# Patient Record
(demographics unavailable — no encounter records)

---

## 2024-10-16 NOTE — REASON FOR VISIT
[Patient preference] : as per patient preference [Continuing, patient seen in-person within last 12 months] : Telehealth services are continuing as patient has been seen in-person within last 12 months. [Telehealth (audio & video) - Individual/Group] : This visit was provided via telehealth using real-time 2-way audio visual technology. [Other Location: e.g. Home (Enter Location, City,State)___] : The patient, [unfilled], was located at [unfilled] at the time of the visit. [Patient's space is appropriate for telehealth and maintains privacy/confidentiality.] : Patient's space is appropriate for telehealth and maintains privacy/confidentiality. [Participant(s) identity verified] : Participant(s) identity verified. [Verbal consent obtained from patient/other participant(s)] : Verbal consent for telehealth/telephonic services obtained from patient/other participant(s) [Patient] : Patient [FreeTextEntry4] : 3:04pm [FreeTextEntry5] : 3:50pm [FreeTextEntry1] : routine outpatient talk therapy; the patient identifies as female, uses she/her pronouns and prefers to be called Emerald

## 2024-10-16 NOTE — PLAN
[Cognitive and/or Behavior Therapy] : Cognitive and/or Behavior Therapy  [Psychoeducation] : Psychoeducation  [Supportive Therapy] : Supportive Therapy [FreeTextEntry2] : Goal(s): Archana will manage her symptoms of gender dysphoria and social anxiety so that they do not interfere with her ability to attend school or socialize. Objective(s): Archana will utilize talk therapy as a safe space to explore her gender identity and gender expression; Archana will engage in activities that are affirming to her, when comfortable; Archana will work to identify triggers to her symptoms of anxiety so that she can develop coping skills to reduce anxious response to these triggers and will report success during session; Archana will gain effective communication and social skills and will report success or challenge in implementing these skills, during session  Frequency: Individual talk therapy with LCSW 1-4x per month; Family therapy with LCSW as needed (PRN), duration: 6-8 months.    [de-identified] : LCSW and Archana met for individual talk therapy via telehealth video visit. Archana was at home during this visit, she consented to this visit and confirmed that there was a private space to speak.  Archana presents as engaged with some improvement in energy and ability to attend classes and take care of some errands to ensure that she has enough food in her apartment.  She does process an episode of suicidal/self-harm thoughts that were fleeting and specific to a trigger of doing poorly on an exam, resulting in Archana admitting that her anxiety "spiraled."  LCSW provides more psychoeducation about the value of distraction and mindfulness in these moments as a coping skill, and we explore how Archana may be able to implement this in reality.  We also acknowledge how "negative" her thought process can become and explore ways to neutralize or ignore these thoughts since she can identify that her brain can be like a "bully."  We review some other behavioral changes that Archana has made to address ADLs and other ways she hopes to make changes to caring for herself.  She reports continued compliance with her HRT and psychotropic medications and continues to affirm herself through her clothing and engaging with folx who respect and support her gender identity, pronouns and preferred name. We agree to meet in 2 weeks to assess how Archana is doing; she confirms that she will reach this LCSW if her symptoms worsen or if she experiences active suicidal thoughts.   Time in:  3:04pm      Time out:   3:50pm Total time spent face to face, via video: 46 min [Return in ____ week(s)] : Return in [unfilled] week(s)

## 2024-10-16 NOTE — RISK ASSESSMENT
[Yes, patient reports ideation or behavior] : Yes, patient reports ideation or behavior [No, patient denies ideation or behavior] : No, patient denies ideation or behavior [Low acute suicide risk] : Low acute suicide risk [FreeTextEntry8] : Emerald reports experiencing passive thoughts for self-harm (wanting to stab herself in the hand) related to a specific trigger, notes that these thoughts lasted about 2 hours before she took a nap; she denies experiencing these thoughts in the last 48 hours since this specific trigger. She denies having means to sharp knives in her apartment and denies any current intent to act on these thoughts. Archana does not have a history of self-harm through cutting/burning and does not have a historty of actual attempts to end her life

## 2024-10-30 NOTE — PHYSICAL EXAM
[Unkept] : unkept [Average] : average [Depressed] : depressed [Anxious] : anxious [Depressive] : depressive [Self-Deprecatory] : self-deprecatory [Phobic] : phobic [None Reported] : none reported [Attention/Concentration] : attention/concentration [Above average] : above average [WNL] : within normal limits

## 2024-10-30 NOTE — REASON FOR VISIT
[Patient preference] : as per patient preference [Other:___] : due to [unfilled] [Continuing, patient seen in-person within last 12 months] : Telehealth services are continuing as patient has been seen in-person within last 12 months. [Telehealth (audio & video) - Individual/Group] : This visit was provided via telehealth using real-time 2-way audio visual technology. [Medical Office: ([facility])___] : The provider was located at the medical office in [unfilled]. [Other Location: e.g. Home (Enter Location, City,State)___] : The patient, [unfilled], was located at [unfilled] at the time of the visit. [Patient's space is appropriate for telehealth and maintains privacy/confidentiality.] : Patient's space is appropriate for telehealth and maintains privacy/confidentiality. [Participant(s) identity verified] : Participant(s) identity verified. [Verbal consent obtained from patient/other participant(s)] : Verbal consent for telehealth/telephonic services obtained from patient/other participant(s) [FreeTextEntry4] : 3:09pm [FreeTextEntry5] : 4:10pm [Patient] : Patient [FreeTextEntry1] : Routine outpatient talk therapy; the patient identifies as female, uses she/her pronouns and prefers to be called Emerald

## 2024-10-30 NOTE — RISK ASSESSMENT
[Yes, patient reports ideation or behavior] : Yes, patient reports ideation or behavior [No, patient denies ideation or behavior] : No, patient denies ideation or behavior [FreeTextEntry8] : passive SI reported. Archana expresses "what's the point" and admits that she wishes she could sleep to avoid dealing with life  [Low acute suicide risk] : Low acute suicide risk [No] : No [FreeTextEntry3] : extended session to assess for risk

## 2024-10-30 NOTE — PLAN
[FreeTextEntry2] : Goal(s): Archana will manage her symptoms of gender dysphoria and social anxiety so that they do not interfere with her ability to attend school or socialize. Objective(s): Archana will utilize talk therapy as a safe space to explore her gender identity and gender expression; Archana will engage in activities that are affirming to her, when comfortable; Archana will work to identify triggers to her symptoms of anxiety so that she can develop coping skills to reduce anxious response to these triggers and will report success during session; Archana will gain effective communication and social skills and will report success or challenge in implementing these skills, during session  Frequency: Individual talk therapy with LCSW 1-4x per month; Family therapy with LCSW as needed (PRN), duration: 6-8 months.    [Cognitive and/or Behavior Therapy] : Cognitive and/or Behavior Therapy  [Supportive Therapy] : Supportive Therapy [de-identified] : LCSW and Archana met for individual talk therapy via telehealth video visit. Archana was at home during this visit, she consented to this visit and confirmed that there was a private space to speak.  Archana presents as engaged, though reporting an increase in depression following a reported panic attack.  Throughout the session, Archana becomes more tearful, and at the end of the session is sobbing due to her anxiety about the future and fear that she is "not able" to work a job to support herself.  Risk is assessed and Archana denies any active ideation to harm herself despite feeling overwhelmed and scared by the future.  We take note of Archana's efforts to address her hygiene and shower, which she was able to do 1x in the past two weeks.  She processes the specific trigger to her panic attack and in processing this experience, she becomes tearful.  Emerald struggles to re-frame and is actively engaging in cognitive distortions that she struggles to challenge with support from this LCSW.  We review concrete activities that she can engage in this coming weekend to support herself in feeling better, including time to spend with friends either in person or virtually, and the value of her avoiding procrastination related to school work so that she has more free time to re-charge.  We agree to resume weekly sessions and Archana is encouraged to reach this LCSW if her ideation becomes more active. Support and validation were provided.    Time in: 3:09pm     Time out: 4:10pm Total time spent face to face, via video: 61 min [Return in ____ week(s)] : Return in [unfilled] week(s)

## 2024-10-30 NOTE — END OF VISIT
[Duration of Psychotherapy Visit (minutes spent in synchronous communication): ____] : Duration of Psychotherapy Visit (minutes spent in synchronous communication): [unfilled] [Individual Psychotherapy for 53+ minutes] : Individual Psychotherapy for 53+ minutes  [FreeTextEntry2] : assessment of risk and safety planning  [Licensed Clinician] : Licensed Clinician

## 2024-11-06 NOTE — REASON FOR VISIT
[Patient preference] : as per patient preference [Other:___] : due to [unfilled] [Continuing, patient seen in-person within last 12 months] : Telehealth services are continuing as patient has been seen in-person within last 12 months. [Telehealth (audio & video) - Individual/Group] : This visit was provided via telehealth using real-time 2-way audio visual technology. [Medical Office: ([facility])___] : The provider was located at the medical office in [unfilled]. [Other Location: e.g. Home (Enter Location, City,State)___] : The patient, [unfilled], was located at [unfilled] at the time of the visit. [Patient's space is appropriate for telehealth and maintains privacy/confidentiality.] : Patient's space is appropriate for telehealth and maintains privacy/confidentiality. [Participant(s) identity verified] : Participant(s) identity verified. [Verbal consent obtained from patient/other participant(s)] : Verbal consent for telehealth/telephonic services obtained from patient/other participant(s) [FreeTextEntry4] : 3:04pm [FreeTextEntry5] : 3:54pm [Patient] : Patient [FreeTextEntry1] : Routine outpatient talk therapy; the patient identifies as female, uses she/her pronouns and prefers to be called Emerald

## 2024-11-06 NOTE — PLAN
[FreeTextEntry2] : Goal(s): Archana will manage her symptoms of gender dysphoria and social anxiety so that they do not interfere with her ability to attend school or socialize. Objective(s): Archana will utilize talk therapy as a safe space to explore her gender identity and gender expression; Archana will engage in activities that are affirming to her, when comfortable; Archana will work to identify triggers to her symptoms of anxiety so that she can develop coping skills to reduce anxious response to these triggers and will report success during session; Archana will gain effective communication and social skills and will report success or challenge in implementing these skills, during session  Frequency: Individual talk therapy with LCSW 1-4x per month; Family therapy with LCSW as needed (PRN), duration: 6-8 months.    [Cognitive and/or Behavior Therapy] : Cognitive and/or Behavior Therapy  [Skills training (all types)] : Skills training (all types)  [Supportive Therapy] : Supportive Therapy [de-identified] : HOMEROW and Archana met for individual talk therapy via telehealth video visit. Archana was in her dorm room during this visit, she consented to this visit and confirmed that there was a private space to speak.  Archana presents with improvement in mental status, she reports some reduction in anxiety and denies no panic attacks in 1 week, but notes ongoing depressed mood and negative thoughts.  She reports how receiving some support from her parents was helpful to her though it has been an ongoing challenge this week to attend the class specific to her major for her fear that she cannot do well enough, thus lending to her avoidant behavior.  Archana is able to identify some more self-care that she can engage in, as we review how vital it is for her to get adequate sleep and care for her bod through drinking and eating enough.  We work together to set some goals and identify the ways that Archana can support herself to work on these goals. Archana is also encouraged to consider how to spend more of her free time with friends so that she can feel both supported and affirmed.  Next visit set for 1 week.    Time in: 3:04pm     Time out:   3:54pm Total time spent face to face, via video:  50 min [Return in ____ week(s)] : Return in [unfilled] week(s)

## 2024-11-13 NOTE — REASON FOR VISIT
[Patient preference] : as per patient preference [Continuing, patient seen in-person within last 12 months] : Telehealth services are continuing as patient has been seen in-person within last 12 months. [Telehealth (audio & video) - Individual/Group] : This visit was provided via telehealth using real-time 2-way audio visual technology. [Medical Office: ([facility])___] : The provider was located at the medical office in [unfilled]. [Other Location: e.g. Home (Enter Location, City,State)___] : The patient, [unfilled], was located at [unfilled] at the time of the visit. [Patient's space is appropriate for telehealth and maintains privacy/confidentiality.] : Patient's space is appropriate for telehealth and maintains privacy/confidentiality. [Participant(s) identity verified] : Participant(s) identity verified. [Verbal consent obtained from patient/other participant(s)] : Verbal consent for telehealth/telephonic services obtained from patient/other participant(s) [Patient] : Patient [FreeTextEntry4] : 3:06pm [FreeTextEntry5] : 3:56pm [FreeTextEntry1] : Routine outpatient talk therapy; the patient identifies as female, uses she/her pronouns and prefers to be called Emerald

## 2024-11-13 NOTE — PLAN
[Cognitive and/or Behavior Therapy] : Cognitive and/or Behavior Therapy  [Skills training (all types)] : Skills training (all types)  [Supportive Therapy] : Supportive Therapy [FreeTextEntry2] : Goal(s): Archana will manage her symptoms of gender dysphoria and social anxiety so that they do not interfere with her ability to attend school or socialize. Objective(s): Archana will utilize talk therapy as a safe space to explore her gender identity and gender expression; Archana will engage in activities that are affirming to her, when comfortable; Archana will work to identify triggers to her symptoms of anxiety so that she can develop coping skills to reduce anxious response to these triggers and will report success during session; Archana will gain effective communication and social skills and will report success or challenge in implementing these skills, during session  Frequency: Individual talk therapy with LCSW 1-4x per month; Family therapy with LCSW as needed (PRN), duration: 6-8 months.    [de-identified] : ALEKS and Archana met for individual talk therapy via telehealth video visit. Archana was at home during this visit, she consented to this visit and confirmed that there was a private space to speak.  Archana presents as engaged, she is calmer than previous sessions but remains depressed.  Archana shares updates regarding withdrawing from a class that she was failing.  She takes time to process this and while she reports "relief", she remains anxious about what her future will include as she was hoping to use this course to explore a potential major.  Archana reports that she intends on completing the remainder of her semester but is unsure of what her plans may be to return to school or consideration for transfer.  We explore how her symptoms are manifesting as she reports experiencing more of a "range" of emotions compared to feelings of numbness like previous weeks.  We note how procrastination and avoidance continue to manifest as barriers for her related to self-care and some necessary ADLs like showering and ensuring that she consistently goes grocery shopping for herself.  We review some goals that she has set for the weekend and the free time she has to "re-charge."  Salma reports receiving support from her mother, which she expresses appreciation for.     Time in:  3:06pm    Time out:  3:56pm Total time spent face to face, via video: 50 min [Return in ____ week(s)] : Return in [unfilled] week(s)

## 2024-11-26 NOTE — REASON FOR VISIT
[Patient preference] : as per patient preference [Continuing, patient seen in-person within last 12 months] : Telehealth services are continuing as patient has been seen in-person within last 12 months. [Telehealth (audio & video) - Individual/Group] : This visit was provided via telehealth using real-time 2-way audio visual technology. [Medical Office: ([facility])___] : The provider was located at the medical office in [unfilled]. [Other Location: e.g. Home (Enter Location, City,State)___] : The patient, [unfilled], was located at [unfilled] at the time of the visit. [Patient's space is appropriate for telehealth and maintains privacy/confidentiality.] : Patient's space is appropriate for telehealth and maintains privacy/confidentiality. [Participant(s) identity verified] : Participant(s) identity verified. [Verbal consent obtained from patient/other participant(s)] : Verbal consent for telehealth/telephonic services obtained from patient/other participant(s) [FreeTextEntry4] : 3:04pm [FreeTextEntry5] : 3:40pm [Patient] : Patient [FreeTextEntry1] : Routine outpatient talk therapy; the patient identifies as female, uses she/her pronouns and prefers to be called Emerald

## 2024-11-26 NOTE — PLAN
[FreeTextEntry2] : Goal(s): Archana will manage her symptoms of gender dysphoria and social anxiety so that they do not interfere with her ability to attend school or socialize. Objective(s): Archana will utilize talk therapy as a safe space to explore her gender identity and gender expression; Archana will engage in activities that are affirming to her, when comfortable; Archana will work to identify triggers to her symptoms of anxiety so that she can develop coping skills to reduce anxious response to these triggers and will report success during session; Archana will gain effective communication and social skills and will report success or challenge in implementing these skills, during session  Frequency: Individual talk therapy with LCSW 1-4x per month; Family therapy with LCSW as needed (PRN), duration: 6-8 months.    [Cognitive and/or Behavior Therapy] : Cognitive and/or Behavior Therapy  [Supportive Therapy] : Supportive Therapy [de-identified] : ALEKS and Archana met for individual talk therapy via telehealth video visit. Archana was in her dorm during this visit, she consented to this visit and confirmed that there was a private space to speak.  Archana presents as depressed but reports "managing" at this time as she prepares to go home for the Thanksgiving holiday.  She notes missing her classes one day earlier this week due to feeling tired and struggling to sleep the night prior; she also reports experience of "auditory hallucinations" that were only present on that day, described as "buzzing" and her alarm going on.  She denies that these have persisted and she has not reported this experience before, and thus agrees to monitor and take note of any other instance.  She presents with ongoing avoidance of specific tasks related to school like registering for classes but notes that the coming deadline will encourage her to "get it done."  ALEKS reminds Archana about her upcoming telehealth visit with Dr. Roman and reminds Archana to schedule a follow-up with psychiatry.  Next visit confirmed for 2 weeks.    Time in: 3:04pm     Time out: 3:40pm Total time spent face to face, via video: 36 min  [Return in ____ week(s)] : Return in [unfilled] week(s)

## 2024-11-27 NOTE — ASSESSMENT
[FreeTextEntry1] : Salma (or Archana) is a 18 yo transfemale (she/her) with PMH of anxiety, depression here for gender affirming care.  #Gender dysphoria: -Counseled patient on options for gender-affirming hormone therapy, goals, risks (decreased libido/erectile function, increased CV and VTE risk, infertility) and benefits of therapy, as well as options for fertility preservation (sperm cryopreservation), and surgical intervention. The patient is not interested in fertility preservation. -Also discussed that estrogen therapy will not impact certain aspects of appearance including height, hand/foot size, voice. Expected timeline for changes (breast growth, decreased muscle mass, softer skin, fewer erections) is 6-18 months. - Discussed options for hair removal including electrolysis and laser - at initial visit: pt opted to start spironolactone 50 mg BID first. Discussed side effects including diuresis/dehydration, hyperkalemia. Discussed modest anti-androgen effect without estrogen, may see some breast development and skin changes. Now ready to start estrogen. We discussed long term effects, expectations, and the patient goals in extensive detail (without the parents present). Archana's parents joined at the end of the visit with her permission where they were free to ask questions. Dad was previously surprised that the plan was not lupron. Discussed that lupron is more often used in adolescent transgender medicine, it can be used in adults but usually not long term due to impact on bone health if not on adequate estrogen dosing. Lupron monotherapy would induce hypogonadism and thus would not be optimal. With patient's goal of eventual estrogen addition, spironolactone made the most sense for long term GAHT for this patient and is the preference of the patient. - pt added estradiol in 6/2023  RX: - spironolactone 150 mg daily  - continue PO estradiol 2 mg bid  Refer for vaginoplasty  LABS: - check serum estradiol (goal 100-200 pg/mL) testosterone (goal <50 ng/dL) today, then q3 months during first year of therapy. Plan to check q6 months to yearly once on stable dose  #Vit D deficiency: - cholecalciferol 2000 iu daily   cell [phone]  RTC 6 months teb ok

## 2024-11-27 NOTE — REASON FOR VISIT
[Follow - Up] : a follow-up visit [Transgender Care] : transgender care [Home] : at home, [unfilled] , at the time of the visit. [Medical Office: ([facility])___] : at the medical office located in  [Patient] : the patient [Self] : self

## 2024-11-27 NOTE — PHYSICAL EXAM

## 2024-11-27 NOTE — HISTORY OF PRESENT ILLNESS
[FreeTextEntry1] : For at least the past 4 years the patient has experienced a postprandial sensation that begins as a bubbling sensation in his throat followed by excess saliva and some throat congestion.  These episodes usually occur with the ingestion of ice tea, fatty foods or fried foods, lactose and certain soup broths.  If he does not eat these foods he does not experience the symptoms.  The episodes occur intermittently and rarely.  He denies any sensation of gastroesophageal reflux but thinks that the symptoms may be due to reflux.  Nevertheless there is no heartburn or dysphagia either.  His appetite and weight are stable.  He has chronic constipation with a bowel movement about once weekly.  His stools are occasionally hard.

## 2024-11-27 NOTE — ASSESSMENT
[FreeTextEntry1] : At this time I am not at all certain as to the etiology of his symptoms but I doubt they are due to gastroesophageal reflux but I cannot be altogether certain.  She will therefore be scheduled for an upper endoscopy.  If there is evidence of esophagitis or Dickey's esophagus then she does indeed have acid reflux.  If the exam is normal it does not exclude that diagnosis but in view of her symptoms it would be most unlikely.  At that point I would simply recommend that she avoid the inciting foods.  I have also recommended that she take Benefiber 1 heaping tablespoon in 6 to 8 ounces of fluid on a daily basis for her underlying constipation.  The risks, benefits, complications and possible adverse consequences associated with upper endoscopy were discussed with the patient.

## 2024-11-27 NOTE — HISTORY OF PRESENT ILLNESS
[FreeTextEntry1] : Salma (or Archana) is a 18 yo transfemale (she/her) with PMH of anxiety, depression here for gender affirming care.  Per chart review, Archana has identified as female for a long time, started voicing ig in May 2021. She has wanted less male hormones. She has been following closely with [facility] and [facility], along with her parents.  She previously has reported that she is not interested in having children and is not interested in fertility preservation.  She would like to present more feminine.   Interested in hormone blockers. Goal is to reduce testosterone, Goal is to eventually start full HRT. Does not want to start estrogen without parents' support.  Ideally wants to start before college in the fall but notes that gradual start is preferred. Looking forward to breast development, wants to be more in tune with emotions. Excited to feel better.  Wants to be able to pass as female, will aim to dress modestly.   Transition history: per Dr. Walker's initial visit: "She reports that she first began to question her gender identity when she saw a  who is trans talked about gender roles expected. She has a friend who came out as trans when they were 13 years old and opened her mind to transgender identity. and that began to open Archana's mind to transgender identities. She often has dreams and fantasies of wanting to turn into a girl or of being a girl. She came out to her parents and brothers 5/8/21 and out to two friends at the time." At age 17, pt was considering lupron with Dr. Walker however this was never started - mother was supportive at time but father needed more time to think about it.   Taking zoloft. Seeing psychiatrist Dr. Hung  Causes of body dysphoria include genitals - just by existing, has general level of discomfort.  Interested in bottom surgery in the future. Not interested in top surgery. Has thought about fertility preservation. 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 Not interested in having children.  Started GAHT in 6/2023   Current regimen: lawson 150 mg daily PO estradiol 2 mg BID  happy with progress   FHx is significant for:  maternal father with CAD and stents  at age 80    SH: no tobacco, drugs, tobacco has one close friend who is great support system (FTM) mom is supportive Starting college at [employer]  studying computer science.  Likes video games-ne 2

## 2024-12-04 NOTE — HISTORY OF PRESENT ILLNESS
[FreeTextEntry1] : Intake 6/2022 reviewed in EHR  [FreeTextEntry2] : Gender therapist at [facility] since 2021\par  Hospitalizations: none\par  Medications: none. \par

## 2024-12-04 NOTE — PLAN
[FreeTextEntry5] :  counseling and support provided, -continue in IT with Jackelin -will continue Zoloft 100mg po qhs for depressive symptoms. Risks and benefits discussed with pt including black box warning. Pt consents to treatment -continue Wellbutrin 300mg xl po daily - risks and benefits discussed with pt including black box warning -med compliance encouraged -vit D, going outside, working out encouraged -er/911 prn -f/u Feb

## 2024-12-04 NOTE — REASON FOR VISIT
[Telehealth (audio & video) - Individual/Group] : This visit was provided via telehealth using real-time 2-way audio visual technology. [Medical Office: ([facility])___] : The provider was located at the medical office in [unfilled]. [Home] : The patient, [unfilled], was located at home, [unfilled], at the time of the visit. [Participant(s) identity verified] : Participant(s) identity verified. [Verbal consent obtained from patient/other participant(s)] : Verbal consent for telehealth/telephonic services obtained from patient/other participant(s) [Patient] : Patient [FreeTextEntry1] : anxiety/mood

## 2024-12-04 NOTE — SOCIAL HISTORY
[FreeTextEntry1] : Born and raised in [place] with bio parents and older brother; reports growing up felt safe and supported in home, experienced bullying in elementary and middle school; gender journey as per hpi; "very bright" student; Had few friends in person in middle school; In high school most interactions online, shared interests of fencing, dungeons and dragons. 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, computer science major.    no known substance use

## 2024-12-09 NOTE — PLAN
[FreeTextEntry2] : Goal(s): Archana will manage her symptoms of gender dysphoria and social anxiety so that they do not interfere with her ability to attend school or socialize. Objective(s): Archana will utilize talk therapy as a safe space to explore her gender identity and gender expression; Archana will engage in activities that are affirming to her, when comfortable; Archana will work to identify triggers to her symptoms of anxiety so that she can develop coping skills to reduce anxious response to these triggers and will report success during session; Archana will gain effective communication and social skills and will report success or challenge in implementing these skills, during session  Frequency: Individual talk therapy with LCSW 1-4x per month; Family therapy with LCSW as needed (PRN), duration: 6-8 months.    [Cognitive and/or Behavior Therapy] : Cognitive and/or Behavior Therapy  [Supportive Therapy] : Supportive Therapy [de-identified] : ALEKS and Archana met for individual talk therapy via telehealth video visit. Archana was in her dorm room at school during this visit, she consented to this visit and confirmed that there was a private space to speak.  Archana reports that she is doing "ok", noting that her time home for the Thanksgiving break was appreciated and allowed her to relax.  She notes feeling overall less depressed with efforts to manage her anxiety, as her semester begins to wind down.  She notes ongoing procrastination with some of her school work, but reports overall feeling that she can manage the remaining work and final exams.  Archana shares that she has thought more about her future, while visiting home with support of her mom, and is registered for classes next semester.  We review some updates from her recent medical visits and she notes compliance with her HRT and psychotropic medications.  We also confirm upcoming visits for the remainder of this month as she returns home for break in between semesters.  Much support and encouragement are provided.    Time in:  3:07pm    Time out: 2:45pm Total time spent face to face, via video: 38 min [Return in ____ week(s)] : Return in [unfilled] week(s)

## 2024-12-09 NOTE — REASON FOR VISIT
[Patient preference] : as per patient preference [Continuing, patient seen in-person within last 12 months] : Telehealth services are continuing as patient has been seen in-person within last 12 months. [Telehealth (audio & video) - Individual/Group] : This visit was provided via telehealth using real-time 2-way audio visual technology. [Medical Office: ([facility])___] : The provider was located at the medical office in [unfilled]. [Other Location: e.g. Home (Enter Location, City,State)___] : The patient, [unfilled], was located at [unfilled] at the time of the visit. [Patient's space is appropriate for telehealth and maintains privacy/confidentiality.] : Patient's space is appropriate for telehealth and maintains privacy/confidentiality. [Participant(s) identity verified] : Participant(s) identity verified. [Verbal consent obtained from patient/other participant(s)] : Verbal consent for telehealth/telephonic services obtained from patient/other participant(s) [FreeTextEntry4] : 3:07pm [FreeTextEntry5] : 3:45pm [Patient] : Patient [FreeTextEntry1] : Routine outpatient talk therapy; the patient identifies as female, uses she/her pronouns and prefers to be called Emerald

## 2024-12-26 NOTE — REASON FOR VISIT
[Patient preference] : as per patient preference [Continuing, patient seen in-person within last 12 months] : Telehealth services are continuing as patient has been seen in-person within last 12 months. [Telehealth (audio & video) - Individual/Group] : This visit was provided via telehealth using real-time 2-way audio visual technology. [Medical Office: ([facility])___] : The provider was located at the medical office in [unfilled]. [Home] : The patient, [unfilled], was located at home, [unfilled], at the time of the visit. [Patient's space is appropriate for telehealth and maintains privacy/confidentiality.] : Patient's space is appropriate for telehealth and maintains privacy/confidentiality. [Participant(s) identity verified] : Participant(s) identity verified. [Verbal consent obtained from patient/other participant(s)] : Verbal consent for telehealth/telephonic services obtained from patient/other participant(s) [Patient] : Patient [FreeTextEntry4] : 3:05pm [FreeTextEntry5] : 3:53pm [FreeTextEntry1] : Routine outpatient talk therapy; the patient identifies as female, uses she/her pronouns and prefers to be called Emerald

## 2024-12-26 NOTE — PLAN
[Cognitive and/or Behavior Therapy] : Cognitive and/or Behavior Therapy  [Supportive Therapy] : Supportive Therapy [FreeTextEntry2] : Goal(s): Archana will manage her symptoms of gender dysphoria and social anxiety so that they do not interfere with her ability to attend school or socialize. Objective(s): Archana will utilize talk therapy as a safe space to explore her gender identity and gender expression; Archana will engage in activities that are affirming to her, when comfortable; Archana will work to identify triggers to her symptoms of anxiety so that she can develop coping skills to reduce anxious response to these triggers and will report success during session; Archana will gain effective communication and social skills and will report success or challenge in implementing these skills, during session  Frequency: Individual talk therapy with LCSW 1-4x per month; Family therapy with LCSW as needed (PRN), duration: 6-8 months.    [de-identified] : HOMEROW and Archana met for individual talk therapy via telehealth video visit. Archana was at home during this visit, she consented to this visit and confirmed that there was a private space to speak.  Archana presents with general improvement in mood since she completed her semester and is back at home on [place].  She shares some updates regarding her final exams and challenges of living with her roommates who made complaints about her being "too loud" while spending time online with her friends, but notes feeling general relief that she was able to successfully complete her semester despite having to drop a class.  Archana's main focus of our session is on her sexual orientation; she has previously identified as aromantic and asexual or in her terms "chantell ace", but notes trying to better understand how she feels after forming some level of a "queer platonic partnership" with a friend she plays DND with.  Archana notes that it has been nice to support each other in a way that goes beyond some of her other friendships and she shares how this person's presence was "calming" to her as she worked to complete her semester, noting that it helped to have this new support.  We explore how sexual orientation can also be a spectrum or something that develops more with the "right person" in our life.  We note how Archana hopes to explore these feelings more over time and she shares some plans to potentially see this person by inviting them to visit her at home, though noting that they reside in [place].  Support and encouragement are provided. We confirm our next visit for 2 weeks.    Time in: 3:05pm     Time out:  3:53pm Total time spent face to face, via video:  48 min [Return in ____ week(s)] : Return in [unfilled] week(s)

## 2024-12-27 NOTE — HISTORY OF PRESENT ILLNESS
[FreeTextEntry1] : 18 yo transfemale who presents for EGD for hypersalivation.   Patient reports when drinks dairy has bubbling sensation in back of throat and develops nonproductive cough.  Patient denies nausea, vomiting, diarrhea, constipation, abdominal pain.  Also denies coffee-ground emesis, hematemesis, hematochezia, melena.  Denies prior travel.  Denies weight loss.  Denies family history of malignancy.

## 2024-12-27 NOTE — ASSESSMENT
[FreeTextEntry1] :  Discussed risks and benefits of procedure including infection, bleeding, perforation.  Patient is in agreement with plan for procedure.

## 2025-01-07 NOTE — PLAN
[FreeTextEntry2] : Goal(s): Archana will manage her symptoms of gender dysphoria and social anxiety so that they do not interfere with her ability to attend school or socialize. Objective(s): Archana will utilize talk therapy as a safe space to explore her gender identity and gender expression; Archana will engage in activities that are affirming to her, when comfortable; Archana will work to identify triggers to her symptoms of anxiety so that she can develop coping skills to reduce anxious response to these triggers and will report success during session; Archana will gain effective communication and social skills and will report success or challenge in implementing these skills, during session  Frequency: Individual talk therapy with LCSW 1-4x per month; Family therapy with LCSW as needed (PRN), duration: 6-8 months.    [Cognitive and/or Behavior Therapy] : Cognitive and/or Behavior Therapy  [Supportive Therapy] : Supportive Therapy [de-identified] : ALEKS and Archana met for individual talk therapy via telehealth video visit. Archana was at home during this visit, she consented to this visit and confirmed that there was a private space to speak.  Archana reports that she is doing "ok" as she continues to enjoy her break between semesters, at home.  She provides some updates regarding a small medical procedure she had the week prior.  Archana also expresses some gratitude for affirming holiday gifts that she received, which she expresses appreciating.  We continue to explore her developing relationship with a long-distance friend whom she shares is coming visit her during the coming week.  We explore some boundaries that Archana hopes to maintain from a physical standpoint but she does well to remain open to how this relationship can develop as she continues to understand her sexual orientation through this experience.  Support and encouragement are offered; we confirm our next visit for 2 weeks in which at that time Archana will be back at school.    Time in:  3:05pm      Time out: 3:44pm Total time spent face to face, via video: 39 min [Return in ____ week(s)] : Return in [unfilled] week(s)

## 2025-01-07 NOTE — REASON FOR VISIT
[Patient preference] : as per patient preference [Continuing, patient seen in-person within last 12 months] : Telehealth services are continuing as patient has been seen in-person within last 12 months. [Telehealth (audio & video) - Individual/Group] : This visit was provided via telehealth using real-time 2-way audio visual technology. [Medical Office: ([facility])___] : The provider was located at the medical office in [unfilled]. [Home] : The patient, [unfilled], was located at home, [unfilled], at the time of the visit. [Patient's space is appropriate for telehealth and maintains privacy/confidentiality.] : Patient's space is appropriate for telehealth and maintains privacy/confidentiality. [Participant(s) identity verified] : Participant(s) identity verified. [Verbal consent obtained from patient/other participant(s)] : Verbal consent for telehealth/telephonic services obtained from patient/other participant(s) [FreeTextEntry4] : 3:05pm [FreeTextEntry5] : 3:44pm [Patient] : Patient [FreeTextEntry1] : Routine outpatient talk therapy; the patient identifies as female, uses she/her pronouns and prefers to be called Emerald

## 2025-01-23 NOTE — PLAN
[FreeTextEntry2] : Goal(s): Archana will manage her symptoms of gender dysphoria and social anxiety so that they do not interfere with her ability to attend school or socialize. Objective(s): Archana will utilize talk therapy as a safe space to explore her gender identity and gender expression; Archana will engage in activities that are affirming to her, when comfortable; Archana will work to identify triggers to her symptoms of anxiety so that she can develop coping skills to reduce anxious response to these triggers and will report success during session; Archana will gain effective communication and social skills and will report success or challenge in implementing these skills, during session  Frequency: Individual talk therapy with LCSW 1-4x per month; Family therapy with LCSW as needed (PRN), duration: 6-8 months.    [Cognitive and/or Behavior Therapy] : Cognitive and/or Behavior Therapy  [Supportive Therapy] : Supportive Therapy [de-identified] : LCSW and Archana met for individual talk therapy via telehealth video visit. Archana was at home during this visit, she consented to this visit and confirmed that there was a private space to speak.  Archana started classes again for her new semester and may arrive late to our sessions due to her class ending shortly before 3pm.  She notes efforts to attend all of her classes so far in the first week, noting the better schedule of classes for this semester (noting all of her classes start after 11am).  Archana processes her final weeks at home during break when her girlfriend came to visit.  She notes the value of being able to meet her and her first experiences with some physical intimacy, but notes that her gender dysphoria was very triggered due to some physical bodily reactions.  Archana processes her wish to have bottom surgery in the future as her genitalia remains a major point of her dysphoria, which is validated by this LCSW.  We set some goals for the upcoming weeks related to Archana attending her classes and ensuring that she gets her course work done.  Archana notes that with support from her girlfriend, she may find it easier to manage her work.  Support and encouragement are offered; we agree to meet in 1 week.   Time in:  3:21pm      Time out:  4:01pm Total time spent face to face, via video: 40 min [Return in ____ week(s)] : Return in [unfilled] week(s)

## 2025-01-23 NOTE — REASON FOR VISIT
[Patient preference] : as per patient preference [Continuing, patient seen in-person within last 12 months] : Telehealth services are continuing as patient has been seen in-person within last 12 months. [Telehealth (audio & video) - Individual/Group] : This visit was provided via telehealth using real-time 2-way audio visual technology. [Medical Office: ([facility])___] : The provider was located at the medical office in [unfilled]. [Other Location: e.g. Home (Enter Location, City,State)___] : The patient, [unfilled], was located at [unfilled] at the time of the visit. [Patient's space is appropriate for telehealth and maintains privacy/confidentiality.] : Patient's space is appropriate for telehealth and maintains privacy/confidentiality. [Participant(s) identity verified] : Participant(s) identity verified. [Verbal consent obtained from patient/other participant(s)] : Verbal consent for telehealth/telephonic services obtained from patient/other participant(s) [FreeTextEntry4] : 3:21pm [FreeTextEntry5] : 4:01pm [Patient] : Patient [FreeTextEntry1] : Routine outpatient talk therapy; the patient identifies as female, uses she/her pronouns and prefers to be called Emerald

## 2025-01-30 NOTE — PLAN
[FreeTextEntry2] : Goal(s): Archana will manage her symptoms of gender dysphoria and social anxiety so that they do not interfere with her ability to attend school or socialize. Objective(s): Archana will utilize talk therapy as a safe space to explore her gender identity and gender expression; Archana will engage in activities that are affirming to her, when comfortable; Archana will work to identify triggers to her symptoms of anxiety so that she can develop coping skills to reduce anxious response to these triggers and will report success during session; Archana will gain effective communication and social skills and will report success or challenge in implementing these skills, during session  Frequency: Individual talk therapy with LCSW 1-4x per month; Family therapy with LCSW as needed (PRN), duration: 6-8 months.    [Cognitive and/or Behavior Therapy] : Cognitive and/or Behavior Therapy  [Supportive Therapy] : Supportive Therapy [de-identified] : ALEKS and Archana met for individual talk therapy via telehealth video visit. Archana was in her dorm room during this visit, she consented to this visit and confirmed that there was a private space to speak.  Archana reports that she is doing "ok", overall adjusting to her new school semester and enjoying this.  She reports some frustration about the current political climate but expresses some gratitude for having access to her HRT at this time, though she has empathy for those who may not have access to care based on their age or where they reside.  We note the value of Emerald finding safe and affirming spaces and spending time with like-minded individuals.  She notes meeting up with her friend on campus earlier in the week which she enjoyed, and has worked to provide support to friends, as well as being grateful to receive support. Archana reports success in attending all of her classes and is generally content with her course schedule and it has been easier for her to manage her sleep schedule and get to classes on time; she also reports enjoying the topics of her classes.  We take a moment to celebrate this and note this as a continued goal for her semester.  We review some self-care goals as Archana has continued to struggle with consistent showering, but this appears to be related more to "laziness" in the present compared to depression.  Support and validation are provided; we agree to meet in 2 weeks.    Time in:   3:09am    Time out:  3:45pm Total time spent face to face, via video: 36 min [Return in ____ week(s)] : Return in [unfilled] week(s)

## 2025-01-30 NOTE — REASON FOR VISIT
[Patient preference] : as per patient preference [Continuing, patient seen in-person within last 12 months] : Telehealth services are continuing as patient has been seen in-person within last 12 months. [Medical Office: ([facility])___] : The provider was located at the medical office in [unfilled]. [Other Location: e.g. Home (Enter Location, City,State)___] : The patient, [unfilled], was located at [unfilled] at the time of the visit. [Patient's space is appropriate for telehealth and maintains privacy/confidentiality.] : Patient's space is appropriate for telehealth and maintains privacy/confidentiality. [Participant(s) identity verified] : Participant(s) identity verified. [Verbal consent obtained from patient/other participant(s)] : Verbal consent for telehealth/telephonic services obtained from patient/other participant(s) [FreeTextEntry4] : 3:09pm [FreeTextEntry5] : 3:45pm  [Patient] : Patient [FreeTextEntry1] : Routine outpatient talk therapy; the patient identifies as female, uses she/her pronouns and prefers to be called Emerald

## 2025-02-12 NOTE — REASON FOR VISIT
[Telehealth (audio & video) - Individual/Group] : This visit was provided via telehealth using real-time 2-way audio visual technology. [Medical Office: ([facility])___] : The provider was located at the medical office in [unfilled]. [Home] : The patient, [unfilled], was located at home, [unfilled], at the time of the visit. [Participant(s) identity verified] : Participant(s) identity verified. [Verbal consent obtained from patient/other participant(s)] : Verbal consent for telehealth/telephonic services obtained from patient/other participant(s) [Patient] : Patient [FreeTextEntry1] : anxiety/mood

## 2025-02-12 NOTE — HISTORY OF PRESENT ILLNESS
[FreeTextEntry1] : Intake 6/2022 reviewed in EHR  [FreeTextEntry2] : Gender therapist at [facility] since 2021\par  Hospitalizations: none\par  Medications: none. \par

## 2025-02-12 NOTE — REASON FOR VISIT
[Patient preference] : as per patient preference [Continuing, patient seen in-person within last 12 months] : Telehealth services are continuing as patient has been seen in-person within last 12 months. [Telehealth (audio & video) - Individual/Group] : This visit was provided via telehealth using real-time 2-way audio visual technology. [Medical Office: ([facility])___] : The provider was located at the medical office in [unfilled]. [Other Location: e.g. Home (Enter Location, City,State)___] : The patient, [unfilled], was located at [unfilled] at the time of the visit. [Patient's space is appropriate for telehealth and maintains privacy/confidentiality.] : Patient's space is appropriate for telehealth and maintains privacy/confidentiality. [Participant(s) identity verified] : Participant(s) identity verified. [Verbal consent obtained from patient/other participant(s)] : Verbal consent for telehealth/telephonic services obtained from patient/other participant(s) [FreeTextEntry4] : 3:16pm [FreeTextEntry5] : 3 3:54pm [Patient] : Patient [FreeTextEntry1] : Routine outpatient talk therapy; the patient identifies as female, uses she/her pronouns and prefers to be called Emerald

## 2025-02-12 NOTE — PLAN
[FreeTextEntry2] : Goal(s): Archana will manage her symptoms of gender dysphoria and social anxiety so that they do not interfere with her ability to attend school or socialize. Objective(s): Archana will utilize talk therapy as a safe space to explore her gender identity and gender expression; Archana will engage in activities that are affirming to her, when comfortable; Archana will work to identify triggers to her symptoms of anxiety so that she can develop coping skills to reduce anxious response to these triggers and will report success during session; Archana will gain effective communication and social skills and will report success or challenge in implementing these skills, during session  Frequency: Individual talk therapy with LCSW 1-4x per month; Family therapy with LCSW as needed (PRN), duration: 6-8 months.    [Cognitive and/or Behavior Therapy] : Cognitive and/or Behavior Therapy  [Psychoeducation] : Psychoeducation  [Supportive Therapy] : Supportive Therapy [de-identified] : ALEKS and Archana met for individual talk therapy via telehealth video visit. Archana was in her dorm room during this visit, she consented to this visit and confirmed that there was a private space to speak.  Archana reports doing ok emotionally overall, she notes getting over being physically sick with cold like symptoms over the last week which resulted in her missing some classes but she reports efforts to attend classes in which she had tests.  Overall, she reports that it has been easier to engage in her semester this semester due to her course schedule, classes themselves and support from her long-distance partner.  Archana does wish to further explore her options for gender affirming bottom surgery as she notes the benefits of her relationship but also how her dysphoria has increased specifically surrounding her genitalia.  LCSW provides education about the options for trans feminine procedures including orchiectomy, zero-depth vaginoplasty/vulvoplasty and full vaginoplasty.  We discuss the procedures briefly as well as the recovery time and responsibility of the patient during recovery related to dilation, for best outcomes.  Archana admits that she was not aware of all of these options for procedures, as well as the need for dilation with the full procedure, and thus will spend some time researching her options and considering when she may be able to have this procedure done, in terms of recovering at home.  We explore if it might be feasible while she is still enrolled in school. Support and encouragement are offered; we confirm our next visit for 2 weeks.    Time in: 3:16pm     Time out:  3:54pm Total time spent face to face, via video: 38 min [Return in ____ week(s)] : Return in [unfilled] week(s)

## 2025-02-12 NOTE — PLAN
[FreeTextEntry5] : counseling and support provided, -continue in IT with Jackelin -will continue Zoloft 100mg po qhs for depressive symptoms. Risks and benefits discussed with pt including black box warning. Pt consents to treatment -continue Wellbutrin 300mg xl po daily - risks and benefits discussed with pt including black box warning -vit D, going outside, working out encouraged -er/911 prn -f/u 3 months

## 2025-02-12 NOTE — SOCIAL HISTORY
[FreeTextEntry1] : Born and raised in [place] with bio parents and older brother; reports growing up felt safe and supported in home, experienced bullying in elementary and middle school; gender journey as per hpi; "very bright" student; Had few friends in person in middle school; In high school most interactions online, shared interests of fencing, dungeons and dragons. 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    no known substance use

## 2025-02-24 NOTE — PLAN
[FreeTextEntry2] : Goal(s): Archana will manage her symptoms of gender dysphoria and social anxiety so that they do not interfere with her ability to attend school or socialize. Objective(s): Archana will utilize talk therapy as a safe space to explore her gender identity and gender expression; Archana will engage in activities that are affirming to her, when comfortable; Archana will work to identify triggers to her symptoms of anxiety so that she can develop coping skills to reduce anxious response to these triggers and will report success during session; Archana will gain effective communication and social skills and will report success or challenge in implementing these skills, during session  Frequency: Individual talk therapy with LCSW 1-4x per month; Family therapy with LCSW as needed (PRN), duration: 6-8 months.    [Cognitive and/or Behavior Therapy] : Cognitive and/or Behavior Therapy  [Supportive Therapy] : Supportive Therapy [de-identified] : HOMEROW and Archana met for individual talk therapy via telehealth video visit. Archana was at home during this visit, she consented to this visit and confirmed that there was a private space to speak.  Archana presents as engaged, she reports doing well overall, can take note of her progress and improvement in mood and ability to manage stressors. Archana also reports enjoying her semester, which has been a positive experience for her.  She does note missing some classes over the last two weeks but relates that this is less avoidant and because of the cold weather and some feelings of loneliness.  Archana expresses gratitude for her long-distance partner whom she receives much support from and works to provide support to.  Archana reports managing her gender dysphoria at this time and we discuss her need for refills of her medication, and an update of her pharmacy is requested from Dr. Roman so that Emerald can be sent this medication at school.  She confirms that she recently attended a virtual session with her psychiatrist, and is taking medication doses as prescribed.  Archana continues to affirm herself through wearing clothing that feels comfortable to her.  We agree that the current frequency of our visits is comfortable for Archana and confirm our next visit for 2 weeks.    Time in:  3:16pm    Time out:  3:53pm Total time spent face to face, via video: 37 min [Return in ____ week(s)] : Return in [unfilled] week(s)

## 2025-02-24 NOTE — REASON FOR VISIT
[Patient preference] : as per patient preference [Continuing, patient seen in-person within last 12 months] : Telehealth services are continuing as patient has been seen in-person within last 12 months. [Telehealth (audio & video) - Individual/Group] : This visit was provided via telehealth using real-time 2-way audio visual technology. [Medical Office: ([facility])___] : The provider was located at the medical office in [unfilled]. [Other Location: e.g. Home (Enter Location, City,State)___] : The patient, [unfilled], was located at [unfilled] at the time of the visit. [Patient's space is appropriate for telehealth and maintains privacy/confidentiality.] : Patient's space is appropriate for telehealth and maintains privacy/confidentiality. [Participant(s) identity verified] : Participant(s) identity verified. [Verbal consent obtained from patient/other participant(s)] : Verbal consent for telehealth/telephonic services obtained from patient/other participant(s) [FreeTextEntry4] : 3:16pm [FreeTextEntry5] : 3:53pm [Patient] : Patient [FreeTextEntry1] : Routine outpatient talk therapy; the patient identifies as female, uses she/her pronouns and prefers to be called Emerald

## 2025-03-13 NOTE — PLAN
[FreeTextEntry2] : Goal(s): Archana will manage her symptoms of gender dysphoria and social anxiety so that they do not interfere with her ability to attend school or socialize. Objective(s): Archana will utilize talk therapy as a safe space to explore her gender identity and gender expression; Archana will engage in activities that are affirming to her, when comfortable; Archana will work to identify triggers to her symptoms of anxiety so that she can develop coping skills to reduce anxious response to these triggers and will report success during session; Archana will gain effective communication and social skills and will report success or challenge in implementing these skills, during session  Frequency: Individual talk therapy with LCSW 1-4x per month; Family therapy with LCSW as needed (PRN), duration: 6-8 months.    [Cognitive and/or Behavior Therapy] : Cognitive and/or Behavior Therapy  [Supportive Therapy] : Supportive Therapy [de-identified] : ALEKS and Archana met for individual talk therapy via telehealth video visit. Archana was in her dorm room at school during this visit, she consented to this visit and confirmed that there was a private space to speak.  Archana presents engaged but appears with lower mood and congruent affect during this visit.  She admits to experiencing some increase in depression but struggles to connect this change to a specific stressor or trigger.  She admits that she misses her long-distance partner but they continue to communicate daily including with video/voice calling.  Archana notes that her biggest symptom is increase in feeling lethargic which has resulted in missing some of her classes, but she denies that this is avoidant behavior. We explore the ways that Archana can seek support from her partner since she admits not being honest about this shift in mood with her partner due to fear of "worrying" her.  She admits to looking forward to her spring break and notes that her parents will be visiting so we identify the ways that she can use their physical support to go grocery shopping in a larger quantity so that she has more food in her dorm as she admits that perhaps her eating/hydration habits are lacking.  ALEKS does note the higher pitch of Archana's voice and she expresses ongoing efforts to raise the pitch of her voice as a way to affirm herself.  She reports compliance with HRT doses as well as psychotropic medications and denies issue with getting her coursework completed and handed in, which we celebrate.  We set some short-term goals for the next two weeks as discussed related to shopping for necessary groceries and focus on hygiene as showering consistently remains a challenge for Archana.  Support and encouragement are offered.   Time in: 3:11pm      Time out:  4:02pm Total time spent face to face, via video: 51 min [Return in ____ week(s)] : Return in [unfilled] week(s)

## 2025-03-13 NOTE — REASON FOR VISIT
[Patient preference] : as per patient preference [Continuing, patient seen in-person within last 12 months] : Telehealth services are continuing as patient has been seen in-person within last 12 months. [Telehealth (audio & video) - Individual/Group] : This visit was provided via telehealth using real-time 2-way audio visual technology. [Medical Office: ([facility])___] : The provider was located at the medical office in [unfilled]. [Other Location: e.g. Home (Enter Location, City,State)___] : The patient, [unfilled], was located at [unfilled] at the time of the visit. [Patient's space is appropriate for telehealth and maintains privacy/confidentiality.] : Patient's space is appropriate for telehealth and maintains privacy/confidentiality. [Participant(s) identity verified] : Participant(s) identity verified. [Verbal consent obtained from patient/other participant(s)] : Verbal consent for telehealth/telephonic services obtained from patient/other participant(s) [FreeTextEntry4] : 3:11pm [FreeTextEntry5] : 4:02pm [Patient] : Patient [FreeTextEntry1] : Routine outpatient talk therapy; the patient identifies as female, uses she/her pronouns and prefers to be called Emerald

## 2025-03-25 NOTE — REASON FOR VISIT
[Patient preference] : as per patient preference [Continuing, patient seen in-person within last 12 months] : Telehealth services are continuing as patient has been seen in-person within last 12 months. [Telehealth (audio & video) - Individual/Group] : This visit was provided via telehealth using real-time 2-way audio visual technology. [Patient's space is appropriate for telehealth and maintains privacy/confidentiality.] : Patient's space is appropriate for telehealth and maintains privacy/confidentiality. [Participant(s) identity verified] : Participant(s) identity verified. [Verbal consent obtained from patient/other participant(s)] : Verbal consent for telehealth/telephonic services obtained from patient/other participant(s) [Patient] : Patient [Medical Office: ([facility])___] : The provider was located at the medical office in [unfilled]. [Other Location: e.g. Home (Enter Location, City,State)___] : The patient, [unfilled], was located at [unfilled] at the time of the visit. [FreeTextEntry4] : 3:15pm [FreeTextEntry5] : 4:02pm [FreeTextEntry1] : Routine outpatient talk therapy; the patient identifies as female, uses she/her pronouns and prefers to be called Emerald

## 2025-03-25 NOTE — PLAN
[Cognitive and/or Behavior Therapy] : Cognitive and/or Behavior Therapy  [Supportive Therapy] : Supportive Therapy [FreeTextEntry2] : Goal(s): Archana will manage her symptoms of gender dysphoria and social anxiety so that they do not interfere with her ability to attend school or socialize. Objective(s): Archana will utilize talk therapy as a safe space to explore her gender identity and gender expression; Archana will engage in activities that are affirming to her, when comfortable; Archana will work to identify triggers to her symptoms of anxiety so that she can develop coping skills to reduce anxious response to these triggers and will report success during session; Archana will gain effective communication and social skills and will report success or challenge in implementing these skills, during session  Frequency: Individual talk therapy with LCSW 1-4x per month; Family therapy with LCSW as needed (PRN), duration: 6-8 months.    [de-identified] : ALEKS and Archana met for individual talk therapy via telehealth video visit. Archana was in her dorm room during this visit, she consented to this visit and confirmed that there was a private space to speak.  Archana presents as depressed, with low energy, mood and congruent affect.  She becomes tearful at some points during our session.  Archana admits that she is mentally disengaging from school due to at least one challenging class that has made her feel like she wants to "give up" on school all together.  She notes not attending many of her classes this week, the first week back after spring break and admits that it has been hard to motivate herself to complete her work.  Archana expresses some thought processes that were more present last semester, including questions what's the "point" of completing school and what the value of getting a job in the future would be, outside of being able to support herself.  We explore truly what a future for her could look like, especially since she has been dating her long-distance partner, and use this as a way to motivate towards achieving goals so that they can live together one day.  Archana is able to engage in some re-framing but ultimately admits that she will just be completing this semester because she "has to" not because she sees value in it.  We review ways that she can seek out motivation or support herself emotionally.  She denies SI and ideation for self-harm, but notes that her self-care remains lacking when she feels like this.  Support and encouragement are offered.   LCSW shares her pregnancy with Emerald and informs her of plans for maternity leave in three months, as well as information about coverage while she is out.  Patient shares appropriate congratulations and agrees to discuss plans for coverage as leave approaches.  							   Time in:  3:15pm    Time out:  4:02pm Total time spent face to face, via video: 47 min [Return in ____ week(s)] : Return in [unfilled] week(s) [FreeTextEntry1] : ALEKS and Archana will resume weekly sessions if Emerald's mood remains low in our next visit in 2 weeks.

## 2025-04-22 NOTE — PLAN
[FreeTextEntry2] : Goal(s): Archana will manage her symptoms of gender dysphoria and social anxiety so that they do not interfere with her ability to attend school or socialize. Objective(s): Archana will utilize talk therapy as a safe space to explore her gender identity and gender expression; Archana will engage in activities that are affirming to her, when comfortable; Archana will work to identify triggers to her symptoms of anxiety so that she can develop coping skills to reduce anxious response to these triggers and will report success during session; Archana will gain effective communication and social skills and will report success or challenge in implementing these skills, during session  Frequency: Individual talk therapy with LCSW 1-4x per month; Family therapy with LCSW as needed (PRN), duration: 6-8 months.    [Cognitive and/or Behavior Therapy] : Cognitive and/or Behavior Therapy  [Supportive Therapy] : Supportive Therapy [de-identified] : ALEKS and Archana met for individual talk therapy via telehealth video visit. Archana was in her dorm room during this visit, she consented to this visit and confirmed that there was a private space to speak.  Archana presents with improvement in mood and affect, compared with our last visit, which she reports as well.  She notes that addressing her struggle with her own self-esteem during our last visit allowed her to be more aware of negative thought processes to ignore or challenge them.  Archana also notes some more efforts for self-care with ADLs such as ensuring she had enough to eat and drink.  Archana also reports efforts to think more about her future by meeting with her advisor to discuss her course of study moving forward and options she has there for coursework.  Archana also notes receiving support from her long distance partner and friends online, feeling less that she needed to "hide" her feelings, thus receiving more support. We review some behavioral changes she can continue to focus on to improve her overall wellness and support herself. We agree to meet again in 1 week prior to her starting her final exams to check-in.  Praise and encouragement are offered.    Time in: 3:10pm     Time out:  3:45pm Total time spent face to face, via video: 35 min [Return in ____ week(s)] : Return in [unfilled] week(s)

## 2025-04-22 NOTE — REASON FOR VISIT
[Patient preference] : as per patient preference [Continuing, patient seen in-person within last 12 months] : Telehealth services are continuing as patient has been seen in-person within last 12 months. [Telehealth (audio & video) - Individual/Group] : This visit was provided via telehealth using real-time 2-way audio visual technology. [Medical Office: ([facility])___] : The provider was located at the medical office in [unfilled]. [Other Location: e.g. Home (Enter Location, City,State)___] : The patient, [unfilled], was located at [unfilled] at the time of the visit. [Patient's space is appropriate for telehealth and maintains privacy/confidentiality.] : Patient's space is appropriate for telehealth and maintains privacy/confidentiality. [Participant(s) identity verified] : Participant(s) identity verified. [Verbal consent obtained from patient/other participant(s)] : Verbal consent for telehealth/telephonic services obtained from patient/other participant(s) [FreeTextEntry4] : 3:10pm [FreeTextEntry5] : 3:45pm  [Patient] : Patient [FreeTextEntry1] :  routine outpatient talk therapy; the patient identifies as female, uses she/her pronouns and prefers to be called Emerald

## 2025-04-29 NOTE — PLAN
[FreeTextEntry2] : Goal(s): Archana will manage her symptoms of gender dysphoria and social anxiety so that they do not interfere with her ability to attend school or socialize. Objective(s): Archana will utilize talk therapy as a safe space to explore her gender identity and gender expression; Archana will engage in activities that are affirming to her, when comfortable; Archana will work to identify triggers to her symptoms of anxiety so that she can develop coping skills to reduce anxious response to these triggers and will report success during session; Archana will gain effective communication and social skills and will report success or challenge in implementing these skills, during session  Frequency: Individual talk therapy with LCSW 1-4x per month; Family therapy with LCSW as needed (PRN), duration: 6-8 months.    [Cognitive and/or Behavior Therapy] : Cognitive and/or Behavior Therapy  [Supportive Therapy] : Supportive Therapy [de-identified] : HOMEROW and Archana met for individual talk therapy via telehealth video visit. Archana was in her dorm room at school during this visit, she consented to this visit and confirmed that there was a private space to speak. Archana reports that she is "doing ok", with overall improvement in her mood, with ongoing effort to manage her energy levels.  Archana notes that she did receive her HRT medication through the mail order pharmacy and is glad to be back on these doses after an almost 2 week break because she was not on top of keeping track of this. Archana expresses looking forward to the end of her semester and feels that she is prepared to complete her final projects and exams, noting that she will likely be back home by 5/3.  She notes feeling more comfortable with returning next semester after another visit with her advisor and planning for her classes and overall major.  We identify some goals for her to prioritize over the summer including coming into the office for a medical visit with Dr. Roman as it has been some time since she was seen in person, as well as the need for psychiatric follow-up with her prescriber.  She also notes wanting to learn more about her options for gender affirming bottom surgery so we note how starting with more research into procedure options and local surgeons would be a good first step in addition to speaking with her mom about these plans.  We confirm our next visit for three weeks when Archana will be back on LI. Encouragement and support are offered.    Time in: 3:16pm     Time out:  3:50pm Total time spent face to face, via video: 34 min [Return in ____ week(s)] : Return in [unfilled] week(s)

## 2025-04-29 NOTE — REASON FOR VISIT
[Patient preference] : as per patient preference [Continuing, patient seen in-person within last 12 months] : Telehealth services are continuing as patient has been seen in-person within last 12 months. [Telehealth (audio & video) - Individual/Group] : This visit was provided via telehealth using real-time 2-way audio visual technology. [Medical Office: ([facility])___] : The provider was located at the medical office in [unfilled]. [Other Location: e.g. Home (Enter Location, City,State)___] : The patient, [unfilled], was located at [unfilled] at the time of the visit. [Patient's space is appropriate for telehealth and maintains privacy/confidentiality.] : Patient's space is appropriate for telehealth and maintains privacy/confidentiality. [Participant(s) identity verified] : Participant(s) identity verified. [Verbal consent obtained from patient/other participant(s)] : Verbal consent for telehealth/telephonic services obtained from patient/other participant(s) [FreeTextEntry4] : 3:16pm [FreeTextEntry5] : 3:50pm [Patient] : Patient [FreeTextEntry1] :  routine outpatient talk therapy; the patient identifies as female, uses she/her pronouns and prefers to be called Emerald

## 2025-05-14 NOTE — SOCIAL HISTORY
[FreeTextEntry1] : Born and raised in [place] with bio parents and older brother; reports growing up felt safe and supported in home, experienced bullying in elementary and middle school; gender journey as per hpi; "very bright" student; Had few friends in person in middle school; In high school most interactions online, shared interests of fencing, dungeons and dragons. 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    no known substance use

## 2025-05-14 NOTE — HISTORY OF PRESENT ILLNESS
[FreeTextEntry1] : Intake 6/2022 reviewed in EHR  [FreeTextEntry2] : Gender therapist at [facility] since 2021\par  Hospitalizations: none\par  Medications: none. \par

## 2025-05-14 NOTE — REASON FOR VISIT
[Telehealth (audio & video) - Individual/Group] : This visit was provided via telehealth using real-time 2-way audio visual technology. [Medical Office: ([facility])___] : The provider was located at the medical office in [unfilled]. [Home] : The patient, [unfilled], was located at home, [unfilled], at the time of the visit. [Participant(s) identity verified] : Participant(s) identity verified. [Verbal consent obtained from patient/other participant(s)] : Verbal consent for telehealth/telephonic services obtained from patient/other participant(s) [Patient] : Patient

## 2025-05-21 NOTE — PLAN
[FreeTextEntry2] : Goal(s): Archana will manage her symptoms of gender dysphoria and social anxiety so that they do not interfere with her ability to attend school or socialize. Objective(s): Archana will utilize talk therapy as a safe space to explore her gender identity and gender expression; Archana will engage in activities that are affirming to her, when comfortable; Archana will work to identify triggers to her symptoms of anxiety so that she can develop coping skills to reduce anxious response to these triggers and will report success during session; Archana will gain effective communication and social skills and will report success or challenge in implementing these skills, during session  Frequency: Individual talk therapy with LCSW 1-4x per month; Family therapy with LCSW as needed (PRN), duration: 6-8 months.    [Cognitive and/or Behavior Therapy] : Cognitive and/or Behavior Therapy  [Supportive Therapy] : Supportive Therapy [de-identified] : LCSW and Archana met for individual talk therapy via telehealth video visit. Archana was at home during this visit, Archana consented to this visit and confirmed that there was a private space to speak.  Overall, Archana appears calm, with generally bright mood and affect, especially since completing her college semester and returning home.  She notes that she was able to pass all of her classes and final exams, which we celebrate.  She hopes to speak with her advisor before returning to campus in August, but for now wishes to be less focused on school, which is validated.  Archana notes some goals for the summer including spending time with friends both in person and online, noting that she is hoping her long-distance girlfriend can come visit at some point in the next two months.  She notes that she will going on family trips which she looks forward to in seeing her cousins/extended family.  We also note the importance of her following up here with the endocrinologist for her ongoing care.  She confirms she recently met with her psychiatrist and has access to her psych medications.  Archana remains anxious about her future but admits that she is putting effort into grounding herself to the present and enjoying her free time.  We review plans to meet again in 2 weeks and coverage for when this LCSW is out on leave.   Time in: 3:03pm     Time out: 3:37pm Total time spent face to face, via video: 34 min [Return in ____ week(s)] : Return in [unfilled] week(s)

## 2025-05-21 NOTE — REASON FOR VISIT
[Patient preference] : as per patient preference [Continuing, patient seen in-person within last 12 months] : Telehealth services are continuing as patient has been seen in-person within last 12 months. [Telehealth (audio & video) - Individual/Group] : This visit was provided via telehealth using real-time 2-way audio visual technology. [Medical Office: ([facility])___] : The provider was located at the medical office in [unfilled]. [Home] : The patient, [unfilled], was located at home, [unfilled], at the time of the visit. [Patient's space is appropriate for telehealth and maintains privacy/confidentiality.] : Patient's space is appropriate for telehealth and maintains privacy/confidentiality. [Participant(s) identity verified] : Participant(s) identity verified. [Verbal consent obtained from patient/other participant(s)] : Verbal consent for telehealth/telephonic services obtained from patient/other participant(s) [FreeTextEntry4] : 3:03pm [FreeTextEntry5] : 3:37pm [Patient] : Patient [FreeTextEntry1] :  routine outpatient talk therapy; the patient identifies as female, uses she/her pronouns and prefers to be called Emerald

## 2025-06-06 NOTE — REASON FOR VISIT
[Patient preference] : as per patient preference [Continuing, patient seen in-person within last 12 months] : Telehealth services are continuing as patient has been seen in-person within last 12 months. [Telehealth (audio & video) - Individual/Group] : This visit was provided via telehealth using real-time 2-way audio visual technology. [Medical Office: ([facility])___] : The provider was located at the medical office in [unfilled]. [Home] : The patient, [unfilled], was located at home, [unfilled], at the time of the visit. [Patient's space is appropriate for telehealth and maintains privacy/confidentiality.] : Patient's space is appropriate for telehealth and maintains privacy/confidentiality. [Participant(s) identity verified] : Participant(s) identity verified. [Verbal consent obtained from patient/other participant(s)] : Verbal consent for telehealth/telephonic services obtained from patient/other participant(s) [FreeTextEntry4] : 3:08pm [FreeTextEntry5] : 3:39pm [Patient] : Patient [FreeTextEntry1] :  routine outpatient talk therapy; the patient identifies as female, uses she/her pronouns and prefers to be called Emerald

## 2025-06-18 NOTE — ASSESSMENT
[FreeTextEntry1] : GERD Reviewed prior labs and notes in chart. Discussed results of biopsies and implications. Patient appears to have reflux, not controlled with medications. Will switch back to PPI. Will plan for EGD for assessment of Dickey's after healing and for Bravo for quantification of reflux- ON PPI. Plan for endoscopic evaluation to assess for esophagitis, Dickey's esophagus, gastritis, h pylori, GIM or ulcerations. Discussed risks and benefits of the procedure including infection, bleeding and perforation and patient is in agreement with plan for EGD Bravo.   - omeprazole BIX x 2 weeks then QD ( sent to pharmacy) - EGD Bravo ON PPI

## 2025-06-18 NOTE — PHYSICAL EXAM
[Alert] : alert [Normal Voice/Communication] : normal voice/communication [Healthy Appearing] : healthy appearing [No Acute Distress] : no acute distress [Sclera] : the sclera and conjunctiva were normal [Hearing Threshold Finger Rub Not Dale] : hearing was normal [Normal Lips/Gums] : the lips and gums were normal [Normal Appearance] : the appearance of the neck was normal [Oropharynx] : the oropharynx was normal [No Neck Mass] : no neck mass was observed [No Respiratory Distress] : no respiratory distress [No Acc Muscle Use] : no accessory muscle use [Respiration, Rhythm And Depth] : normal respiratory rhythm and effort [Auscultation Breath Sounds / Voice Sounds] : lungs were clear to auscultation bilaterally [Heart Rate And Rhythm] : heart rate was normal and rhythm regular [Normal S1, S2] : normal S1 and S2 [Murmurs] : no murmurs [Bowel Sounds] : normal bowel sounds [Abdomen Tenderness] : non-tender [No Masses] : no abdominal mass palpated [Abdomen Soft] : soft [] : no hepatosplenomegaly [Oriented To Time, Place, And Person] : oriented to person, place, and time

## 2025-06-18 NOTE — HISTORY OF PRESENT ILLNESS
[FreeTextEntry1] : 19 yo jayne presents for follow up on hypersalivation.   On prior visit, patient seen by Dr. Woody who noted Bubbling gurgling with fried fatty foods, lactose, soup broth. Patient avoids those foods. No reflux, dysphagia. Constipation  EGD 12/2024: LA B Esophagitis, biopsies with esophagitis neg for h pylori. Patient started on omeprazole BID x 8 weeks.   Today: Patient continues to have intermittent reflux. SHe has completed PPI BID and is taking famotidine as needed with reflux of acid into her chest. hypersalivation and burping continue as well.   [de-identified] : 12/27/2024:  Grade B esophagitis compatible with nonspecific erosive esophagitis.    Esophageal hiatal hernia.    Esophagus otherwise normal (Biopsy).    Normal mucosa in the whole stomach.    Normal mucosa in the whole examined duodenum.    Plan:    Discharge when criteria met    Start omeprazole once per day x 8 weeks for esophagitis    Return to clinic in 8 weeks to discuss symptoms and repeat EGD to assess for  underlying Dickey's esophagus    1. Stomach, random , biopsy  - Mild chronic inactive gastritis - No Helicobacter pylori  identified  (immunostain) 2. Esophagus,  distal, biopsy - Squamous mucosa - Minimal chronic inflammation - No eosinophils , glandular mucosa, or dysplasia identified  3. Esophagus, mid, biopsy - Squamous mucosa - Minimal chronic inflammation and edema - Eosinophils present ( 3 - 5 / HPF) - No glandular mucosa, or dysplasia identified

## 2025-07-18 NOTE — ASSESSMENT
[FreeTextEntry1] : Salma (or Archana) is a 21 yo transfemale (she/her) with PMH of anxiety, depression here for gender affirming care.  #Gender dysphoria: -Counseled patient on options for gender-affirming hormone therapy, goals, risks (decreased libido/erectile function, increased CV and VTE risk, infertility) and benefits of therapy, as well as options for fertility preservation (sperm cryopreservation), and surgical intervention. The patient is not interested in fertility preservation. -Also discussed that estrogen therapy will not impact certain aspects of appearance including height, hand/foot size, voice. Expected timeline for changes (breast growth, decreased muscle mass, softer skin, fewer erections) is 6-18 months. - Discussed options for hair removal including electrolysis and laser - at initial visit: pt opted to start spironolactone 50 mg BID first. Discussed side effects including diuresis/dehydration, hyperkalemia. Discussed modest anti-androgen effect without estrogen, may see some breast development and skin changes. Now ready to start estrogen. We discussed long term effects, expectations, and the patient goals in extensive detail (without the parents present). Archana's parents joined at the end of the visit with her permission where they were free to ask questions. Dad was previously surprised that the plan was not lupron. Discussed that lupron is more often used in adolescent transgender medicine, it can be used in adults but usually not long term due to impact on bone health if not on adequate estrogen dosing. Lupron monotherapy would induce hypogonadism and thus would not be optimal. With patient's goal of eventual estrogen addition, spironolactone made the most sense for long term GAHT for this patient and is the preference of the patient. - pt added estradiol in 6/2023  RX: - spironolactone 150 mg daily  - continue PO estradiol 2 mg bid  check labs   Referred for vaginoplasty previously - will consider after college   LABS: - check serum estradiol (goal 100-200 pg/mL) testosterone (goal <50 ng/dL) today, then q3 months during first year of therapy. Plan to check q6 months to yearly once on stable dose  #Vit D deficiency: - check labs   cell [phone]  RTC 6 months

## 2025-07-18 NOTE — HISTORY OF PRESENT ILLNESS
[FreeTextEntry1] : Salma (or Archana) is a 21 yo transfemale (she/her) with PMH of anxiety, depression here for gender affirming care.  Per chart review, Archana has identified as female for a long time, started voicing ig in May 2021. She has wanted less male hormones. She has been following closely with [facility] and [facility], along with her parents.  She previously has reported that she is not interested in having children and is not interested in fertility preservation.  She would like to present more feminine.   Interested in hormone blockers. Goal is to reduce testosterone, Goal is to eventually start full HRT. Does not want to start estrogen without parents' support.  Ideally wants to start before college in the fall but notes that gradual start is preferred. Looking forward to breast development, wants to be more in tune with emotions. Excited to feel better.  Wants to be able to pass as female, will aim to dress modestly.   Transition history: per Dr. Walker's initial visit: "She reports that she first began to question her gender identity when she saw a  who is trans talked about gender roles expected. She has a friend who came out as trans when they were 13 years old and opened her mind to transgender identity. and that began to open Archana's mind to transgender identities. She often has dreams and fantasies of wanting to turn into a girl or of being a girl. She came out to her parents and brothers 5/8/21 and out to two friends at the time." At age 17, pt was considering lupron with Dr. Walker however this was never started - mother was supportive at time but father needed more time to think about it.   Taking zoloft. Seeing psychiatrist  Causes of body dysphoria include genitals - just by existing, has general level of discomfort.  Interested in bottom surgery in the future. Not interested in top surgery. Has thought about fertility preservation. 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 Not interested in having children.  Started GA in 6/2023   Current regimen: lawson 150 mg daily PO estradiol 2 mg BID  happy with progress   FHx is significant for:  maternal father with CAD and stents  at age 80    SH: no tobacco, drugs, tobacco has one close friend who is great support system (FTM) mom is supportive in college at [employer]  studying computer science.  Likes video games-[employer]

## 2025-07-18 NOTE — PLAN
[FreeTextEntry2] : Goal(s): Archana will manage her symptoms of gender dysphoria and social anxiety so that they do not interfere with her ability to attend school or socialize. Objective(s): Archana will utilize talk therapy as a safe space to explore her gender identity and gender expression; Archana will engage in activities that are affirming to her, when comfortable; Archana will work to identify triggers to her symptoms of anxiety so that she can develop coping skills to reduce anxious response to these triggers and will report success during session; Archana will gain effective communication and social skills and will report success or challenge in implementing these skills, during session  Frequency: Individual talk therapy with LCSW 1-4x per month; Family therapy with LCSW as needed (PRN), duration: 6-8 months.    [de-identified] : Provider met with pt Emerald (she/her) (in chart as Matthew) for a 40-minute psychotherapy session in-person. Provider is covering for Archana's regular therapist Jackelin Goode LCSW while out on leave. As this was provider's first appointment with pt, provider introduced himself and reviewed confidentiality, limits of confidentiality, HIPAA, and clinic policies and practices. Pt reported verbal understanding of this information and denied having any questions.   This session focused on rapport building and setting interim treatment goals for coverage. Pt reported doing well overall although has had some stress in the past week when her girlfriend was visiting and pt's parents were pressuring both pt and gf to do more outside of the house. Pt reported generally having an affirming support system including her mother and friends (both here and at college) but does struggle to be affirmed by her father and generally avoids directly discussing gender transition issues with him. Pt was receptive to discussing next steps in gender transition including bottom surgery and provider offered to help mediate conversations with parents as needed. Pt requested to work on procrastination as a short-term treatment goal given increased difficulties keeping up with work from college and tendency to avoid and miss deadlines. Pt reported missing regular therapist but also   ALEKS and Archana met for individual talk therapy via telehealth video visit. Archana was at home during this visit, she consented to this visit and confirmed that there was a private space to speak.  Archana reports that she is doing well overall, notes that her depression and anxiety feel managed at this time, though we note the reduction in stressors related to being finished with her college semester.  Archana notes some challenges with her sleep schedule but reports improvement in her hygiene, indicating that it is more comfortable to shower at home when she sharing bathroom space with her family versus roommates who do not keep the space clean.  She notes that with more regular showering, she feelings more affirmed in her femininity, which we celebrate. We explore some goals that Archana has for the coming months including need to come into the office for an appointment with Dr. Roman, continue to focus on her self-care related to drinking enough fluids and eating enough, and her goal to make plans with her long-distance partner to come visit.  Archana does share efforts to learn to cook some new meals, and we set a goal to expand this knowledge, also noting how it supports her independence.  We agree to meet again in about 2 weeks and Archana is aware that this will likely be our last session prior to this LCSW being out for leave; Archana did request coverage from Dr. Malone' until this LCSW returns to the office.    Time in:  3:08pm      Time out:  3:39pm Total time spent face to face, via video: 31 min [Return in ____ week(s)] : Return in [unfilled] week(s) [FreeTextEntry1] : PLAN  Will meet biweekly for psychotherapy coverage focusing on addressing procrastiantion and gender affirming care. Next appointment will be TEB August 8th   TIME-BASED BILLING  I spent 45 minutes of time on this encounter.

## 2025-07-18 NOTE — REASON FOR VISIT
[Follow - Up] : a follow-up visit [Transgender Care] : transgender care [Home] : at home, [unfilled] , at the time of the visit. [Medical Office: ([facility])___] : at the medical office located in  [Patient] : the patient [Self] : self

## 2025-07-18 NOTE — REASON FOR VISIT
[Patient's space is appropriate for telehealth and maintains privacy/confidentiality.] : Patient's space is appropriate for telehealth and maintains privacy/confidentiality. [Patient] : Patient [FreeTextEntry1] :  routine outpatient talk therapy; the patient identifies as female, uses she/her pronouns and prefers to be called Emerald